# Patient Record
Sex: MALE
[De-identification: names, ages, dates, MRNs, and addresses within clinical notes are randomized per-mention and may not be internally consistent; named-entity substitution may affect disease eponyms.]

---

## 2023-04-26 LAB
CYSTATIN C: 1.1 MG/L (ref 0.63–1.03)
EGFR BY CYSTATIN C: 72 ML/MIN/BSA

## 2023-12-28 ENCOUNTER — TRANSCRIBE ORDERS (OUTPATIENT)
Dept: SCHEDULING | Age: 48
End: 2023-12-28

## 2023-12-28 DIAGNOSIS — Z00.6 ENCOUNTER FOR EXAMINATION FOR NORMAL COMPARISON AND CONTROL IN CLINICAL RESEARCH PROGRAM: Primary | ICD-10-CM

## 2024-02-29 DIAGNOSIS — Z00.6 RESEARCH STUDY PATIENT: Primary | ICD-10-CM

## 2024-03-04 ENCOUNTER — DOCUMENTATION (OUTPATIENT)
Dept: IMMUNOLOGY | Facility: CLINIC | Age: 49
End: 2024-03-04

## 2024-03-04 ENCOUNTER — LAB (OUTPATIENT)
Dept: LAB | Facility: LAB | Age: 49
End: 2024-03-04

## 2024-03-04 DIAGNOSIS — Z00.6 RESEARCH STUDY PATIENT: ICD-10-CM

## 2024-03-04 PROCEDURE — 36415 COLL VENOUS BLD VENIPUNCTURE: CPT

## 2024-03-04 PROCEDURE — 82610 CYSTATIN C: CPT

## 2024-03-04 NOTE — PROGRESS NOTES
Spoke with participant regarding the sleep study done on the Recover Covid Study which showed 'sleep apnea with hypoxemia'.   He states he had seen the results in [facility] and has already followed up with his PCP.  Further testing has already been ordered.

## 2024-03-06 LAB
CYSTATIN C SERPL-MCNC: 1.1 MG/L (ref 0.5–1.2)
GFR/BSA.PRED SERPLBLD CYS-BASED-ARV: 71 ML/MIN/BSA

## 2024-07-30 ENCOUNTER — LAB (OUTPATIENT)
Dept: LAB | Facility: LAB | Age: 49
End: 2024-07-30

## 2024-07-30 DIAGNOSIS — Z00.6 RESEARCH EXAM: Primary | ICD-10-CM

## 2024-07-30 DIAGNOSIS — Z00.6 RESEARCH EXAM: ICD-10-CM

## 2024-07-30 DIAGNOSIS — Z00.6 RESEARCH STUDY PATIENT: ICD-10-CM

## 2024-07-30 LAB
CREAT UR-MCNC: 33.5 MG/DL (ref 20–370)
MICROALBUMIN UR-MCNC: <7 MG/L
MICROALBUMIN/CREAT UR: NORMAL MG/G{CREAT}

## 2024-07-30 PROCEDURE — 82570 ASSAY OF URINE CREATININE: CPT

## 2024-07-30 PROCEDURE — 82043 UR ALBUMIN QUANTITATIVE: CPT

## 2024-07-31 ENCOUNTER — LAB (OUTPATIENT)
Dept: LAB | Facility: LAB | Age: 49
End: 2024-07-31

## 2024-07-31 DIAGNOSIS — Z00.6 RESEARCH STUDY PATIENT: ICD-10-CM

## 2024-07-31 DIAGNOSIS — Z00.6 RESEARCH EXAM: Primary | ICD-10-CM

## 2024-07-31 DIAGNOSIS — Z00.6 RESEARCH EXAM: ICD-10-CM

## 2024-07-31 LAB
CREAT UR-MCNC: 54.5 MG/DL (ref 20–370)
CREAT UR-MCNC: 98 MG/DL (ref 20–370)
MICROALBUMIN UR-MCNC: 14.3 MG/L
MICROALBUMIN UR-MCNC: 9 MG/L
MICROALBUMIN/CREAT UR: 14.6 UG/MG CREAT
MICROALBUMIN/CREAT UR: 16.5 UG/MG CREAT

## 2024-07-31 PROCEDURE — 82043 UR ALBUMIN QUANTITATIVE: CPT

## 2024-07-31 PROCEDURE — 82570 ASSAY OF URINE CREATININE: CPT

## 2024-09-20 DIAGNOSIS — Z00.6 RESEARCH STUDY PATIENT: ICD-10-CM

## 2024-11-11 ENCOUNTER — HOSPITAL ENCOUNTER (OUTPATIENT)
Dept: RADIOLOGY | Facility: HOSPITAL | Age: 49
Discharge: HOME | End: 2024-11-11

## 2024-11-11 DIAGNOSIS — Z00.6 RESEARCH STUDY PATIENT: ICD-10-CM

## 2024-11-11 PROCEDURE — 78264 GASTRIC EMPTYING IMG STUDY: CPT

## 2024-11-11 PROCEDURE — 3430000001 HC RX 343 DIAGNOSTIC RADIOPHARMACEUTICALS: Performed by: INTERNAL MEDICINE

## 2024-12-09 DIAGNOSIS — Z00.6 RESEARCH EXAM: ICD-10-CM

## 2024-12-09 RX ORDER — ALBUTEROL SULFATE 90 UG/1
4 INHALANT RESPIRATORY (INHALATION) ONCE
OUTPATIENT
Start: 2024-12-09

## 2024-12-09 RX ORDER — ALBUTEROL SULFATE 0.83 MG/ML
3 SOLUTION RESPIRATORY (INHALATION) ONCE
OUTPATIENT
Start: 2024-12-09 | End: 2024-12-09

## 2025-01-16 ENCOUNTER — APPOINTMENT (OUTPATIENT)
Dept: RESPIRATORY THERAPY | Facility: HOSPITAL | Age: 50
End: 2025-01-16

## 2025-02-17 ENCOUNTER — APPOINTMENT (OUTPATIENT)
Dept: RESPIRATORY THERAPY | Facility: HOSPITAL | Age: 50
End: 2025-02-17

## 2025-03-24 ENCOUNTER — APPOINTMENT (OUTPATIENT)
Dept: RESPIRATORY THERAPY | Facility: HOSPITAL | Age: 50
End: 2025-03-24

## 2025-07-22 DIAGNOSIS — Z00.6 RESEARCH STUDY PATIENT: Primary | ICD-10-CM

## 2025-07-30 ENCOUNTER — APPOINTMENT (OUTPATIENT)
Dept: LAB | Facility: LAB | Age: 50
End: 2025-07-30

## 2025-07-30 ENCOUNTER — HOSPITAL ENCOUNTER (OUTPATIENT)
Dept: RESPIRATORY THERAPY | Facility: HOSPITAL | Age: 50
Discharge: HOME | End: 2025-07-30

## 2025-07-30 DIAGNOSIS — Z00.6 RESEARCH STUDY PATIENT: ICD-10-CM

## 2025-07-30 DIAGNOSIS — Z00.6 RESEARCH EXAM: ICD-10-CM

## 2025-07-30 LAB
EST. AVERAGE GLUCOSE BLD GHB EST-MCNC: 114 MG/DL
HBA1C MFR BLD: 5.6 % (ref ?–5.7)
MGC ASCENT PFT - FEV1 - PRE: 3.72
MGC ASCENT PFT - FEV1 - PRE: 3.72
MGC ASCENT PFT - FEV1 - PREDICTED: 3.87
MGC ASCENT PFT - FEV1 - PREDICTED: 3.87
MGC ASCENT PFT - FVC - PRE: 4.95
MGC ASCENT PFT - FVC - PRE: 4.95
MGC ASCENT PFT - FVC - PREDICTED: 4.89
MGC ASCENT PFT - FVC - PREDICTED: 4.89

## 2025-07-30 PROCEDURE — 36415 COLL VENOUS BLD VENIPUNCTURE: CPT
